# Patient Record
Sex: FEMALE | Race: WHITE | NOT HISPANIC OR LATINO | URBAN - NONMETROPOLITAN AREA
[De-identification: names, ages, dates, MRNs, and addresses within clinical notes are randomized per-mention and may not be internally consistent; named-entity substitution may affect disease eponyms.]

---

## 2017-06-29 PROBLEM — H35.3131: Noted: 2017-06-29

## 2019-07-11 ENCOUNTER — IMPORTED ENCOUNTER (OUTPATIENT)
Dept: URBAN - NONMETROPOLITAN AREA CLINIC 1 | Facility: CLINIC | Age: 69
End: 2019-07-11

## 2019-07-11 PROCEDURE — 99214 OFFICE O/P EST MOD 30 MIN: CPT

## 2019-07-11 PROCEDURE — 92134 CPTRZ OPH DX IMG PST SGM RTA: CPT

## 2019-07-11 NOTE — PATIENT DISCUSSION
pseudophakia oumonitorAMD - dry-Explained dry AMD and advised that there are no treatments available at this time.-Continue AREDS 2 MVT. -Continue Amsler grid monitoring daily.  Pt is to contact our office if any changes are noted.; 's Notes: s/p CE [id number] IQ IOL OD

## 2020-07-16 ENCOUNTER — IMPORTED ENCOUNTER (OUTPATIENT)
Dept: URBAN - NONMETROPOLITAN AREA CLINIC 1 | Facility: CLINIC | Age: 70
End: 2020-07-16

## 2020-07-16 PROBLEM — Z96.1: Noted: 2020-07-16

## 2020-07-16 PROBLEM — H35.3131: Noted: 2020-07-16

## 2020-07-16 PROCEDURE — 99214 OFFICE O/P EST MOD 30 MIN: CPT

## 2020-07-16 PROCEDURE — 92134 CPTRZ OPH DX IMG PST SGM RTA: CPT

## 2020-07-16 NOTE — PATIENT DISCUSSION
AMD - dry-Explained dry AMD and advised that there are no treatments available at this time.-Continue AREDS 2 MVT. -Continue Amsler grid monitoring daily. Pt is to contact our office if any changes are noted. oct today stable ous/p PCIOL-Stable PCIOL OU.-Monitor for PCO. -RTC . Blepharitis-Discussed with pt.-Recommend pt begin warm compresses and lid scrubs BID. Pt instructed on proper lid scrub technique. -Recommend artificial tears OU QID PRN. start maxitrol 1gtt qid od x 1wk then use per flares; Dr's Notes: s/p CE [id number] IQ IOL OD

## 2021-08-12 ENCOUNTER — IMPORTED ENCOUNTER (OUTPATIENT)
Dept: URBAN - NONMETROPOLITAN AREA CLINIC 1 | Facility: CLINIC | Age: 71
End: 2021-08-12

## 2021-08-12 PROCEDURE — 99214 OFFICE O/P EST MOD 30 MIN: CPT

## 2021-08-12 NOTE — PATIENT DISCUSSION
AMD - dry-Explained dry AMD and advised that there are no treatments available at this time.-Continue AREDS 2 MVT. -Continue Amsler grid monitoring daily. Pt is to contact our office if any changes are noted. oct today stable ous/p PCIOL-Stable PCIOL OU.-Monitor for PCO. Blepharitis-Discussed with pt.-Recommend pt begin warm compresses and lid scrubs BID. Pt instructed on proper lid scrub technique. -Recommend artificial tears OU QID PRN. start maxitrol 1gtt qid od x 1wk then use per flares; Dr's Notes: s/p CE [id number] IQ IOL OD

## 2022-04-10 ASSESSMENT — TONOMETRY
OS_IOP_MMHG: 17
OD_IOP_MMHG: 16
OD_IOP_MMHG: 16
OD_IOP_MMHG: 20
OS_IOP_MMHG: 16
OS_IOP_MMHG: 18

## 2022-04-10 ASSESSMENT — VISUAL ACUITY
OD_CC: 20/40-1
OS_SC: 20/20-2
OD_CC: 20/25
OU_SC: 20/20-2
OD_PH: 20/25-1
OS_CC: 20/30-1
OU_CC: J1+
OS_SC: J1
OS_CC: 20/25
OS_CC: 20/25-1
OD_SC: 20/20-2
OD_CC: 20/25-2
OD_SC: J1

## 2022-08-18 ENCOUNTER — ESTABLISHED PATIENT (OUTPATIENT)
Dept: RURAL CLINIC 1 | Facility: CLINIC | Age: 72
End: 2022-08-18

## 2022-08-18 DIAGNOSIS — Z96.1: ICD-10-CM

## 2022-08-18 DIAGNOSIS — H35.3131: ICD-10-CM

## 2022-08-18 PROCEDURE — 99214 OFFICE O/P EST MOD 30 MIN: CPT

## 2022-08-18 ASSESSMENT — VISUAL ACUITY
OU_SC: 20/25
OS_SC: 20/30
OD_SC: 20/30

## 2022-08-18 ASSESSMENT — TONOMETRY
OS_IOP_MMHG: 14
OD_IOP_MMHG: 14